# Patient Record
Sex: FEMALE | Race: WHITE | Employment: PART TIME | URBAN - METROPOLITAN AREA
[De-identification: names, ages, dates, MRNs, and addresses within clinical notes are randomized per-mention and may not be internally consistent; named-entity substitution may affect disease eponyms.]

---

## 2018-09-25 ENCOUNTER — OFFICE VISIT (OUTPATIENT)
Dept: FAMILY MEDICINE CLINIC | Age: 38
End: 2018-09-25

## 2018-09-25 VITALS
BODY MASS INDEX: 17.92 KG/M2 | HEART RATE: 78 BPM | TEMPERATURE: 97.5 F | OXYGEN SATURATION: 98 % | WEIGHT: 114.2 LBS | SYSTOLIC BLOOD PRESSURE: 108 MMHG | HEIGHT: 67 IN | RESPIRATION RATE: 12 BRPM | DIASTOLIC BLOOD PRESSURE: 68 MMHG

## 2018-09-25 DIAGNOSIS — R30.0 DYSURIA: ICD-10-CM

## 2018-09-25 DIAGNOSIS — N30.10 INTERSTITIAL CYSTITIS: ICD-10-CM

## 2018-09-25 DIAGNOSIS — N30.00 ACUTE CYSTITIS WITHOUT HEMATURIA: Primary | ICD-10-CM

## 2018-09-25 LAB
BILIRUB UR QL STRIP: NORMAL
GLUCOSE UR-MCNC: NORMAL MG/DL
KETONES P FAST UR STRIP-MCNC: NORMAL MG/DL
PH UR STRIP: 5 [PH] (ref 4.6–8)
PROT UR QL STRIP: NORMAL
SP GR UR STRIP: 1.01 (ref 1–1.03)
UA UROBILINOGEN AMB POC: NORMAL (ref 0.2–1)
URINALYSIS CLARITY POC: CLEAR
URINALYSIS COLOR POC: NORMAL
URINE BLOOD POC: NORMAL
URINE LEUKOCYTES POC: NEGATIVE
URINE NITRITES POC: POSITIVE

## 2018-09-25 RX ORDER — MONTELUKAST SODIUM 10 MG/1
10 TABLET ORAL DAILY
COMMUNITY
End: 2020-10-09

## 2018-09-25 RX ORDER — SULFAMETHOXAZOLE AND TRIMETHOPRIM 800; 160 MG/1; MG/1
1 TABLET ORAL 2 TIMES DAILY
Qty: 6 TAB | Refills: 0 | Status: SHIPPED | OUTPATIENT
Start: 2018-09-25 | End: 2018-09-28

## 2018-09-25 NOTE — MR AVS SNAPSHOT
[facility] [address] [place] [address] Road 
[phone] Patient: Rolly Sicard MRN: [id number] [id number] Visit Information Date & Time Provider Department Dept. Phone Encounter #  
 9/25/2018 11:30 AM Kenia Gorman MD [facility] [phone] [id number] Follow-up Instructions Return if symptoms worsen or fail to improve. Upcoming Health Maintenance Date Due Influenza Age 5 to Adult 8/1/2018 PAP AKA CERVICAL CYTOLOGY 9/8/2019 DTaP/Tdap/Td series (2 - Td) 12/2/2026 Allergies as of 9/25/2018  Review Complete On: 9/25/2018 By: May Viramontes No Known Allergies Current Immunizations  Never Reviewed Name Date  
 TB Skin Test (PPD) Intradermal 10/31/2016 Not reviewed this visit You Were Diagnosed With   
  
 Codes Comments Acute cystitis without hematuria    -  Primary ICD-10-CM: N30.00 ICD-9-CM: 595.0 Dysuria     ICD-10-CM: R30.0 ICD-9-CM: 740. 1 Vitals BP Pulse Temp Resp Height(growth percentile) Weight(growth percentile) 108/68 78 97.5 °F (36.4 °C) (Oral) 12 5' 7\" (1.702 m) 114 lb 3.2 oz (51.8 kg) SpO2 BMI OB Status Smoking Status 98% 17.89 kg/m2 Having regular periods Never Smoker BMI and BSA Data Body Mass Index Body Surface Area  
 17.89 kg/m 2 1.56 m 2 Preferred Pharmacy Pharmacy Name Phone [facility] [phone] - [address], [address] AT [address] [phone] Your Updated Medication List  
  
   
This list is accurate as of 9/25/18 12:12 PM.  Always use your most recent med list.  
  
  
  
  
 ELMIRON 100 mg capsule Generic drug:  pentosan polysulfate sodium  
  
 multivitamin tablet Commonly known as:  ONE A DAY Take 1 Tab by mouth daily. SINGULAIR 10 mg tablet Generic drug:  montelukast  
Take 10 mg by mouth daily. trimethoprim-sulfamethoxazole 160-800 mg per tablet Commonly known as:  BACTRIM DS Take 1 Tab by mouth two (2) times a day for 3 days. Prescriptions Sent to Pharmacy Refills  
 trimethoprim-sulfamethoxazole (BACTRIM DS) 160-800 mg per tablet 0 Sig: Take 1 Tab by mouth two (2) times a day for 3 days. Class: Normal  
 Pharmacy: [facility] [address] [place] [address] AT [address] Ph #: [phone] Route: Oral  
  
We Performed the Following AMB POC URINALYSIS DIP STICK AUTO W/O MICRO [55142 CPT(R)] Follow-up Instructions Return if symptoms worsen or fail to improve. Patient Instructions Pyridium comes in 100 and 200 mg, you can take it up to 3x/day Urinary Tract Infection in Women: Care Instructions Your Care Instructions A urinary tract infection, or UTI, is a general term for an infection anywhere between the kidneys and the urethra (where urine comes out). Most UTIs are bladder infections. They often cause pain or burning when you urinate. UTIs are caused by bacteria and can be cured with antibiotics. Be sure to complete your treatment so that the infection goes away. Follow-up care is a key part of your treatment and safety. Be sure to make and go to all appointments, and call your doctor if you are having problems. It's also a good idea to know your test results and keep a list of the medicines you take. How can you care for yourself at home? · Take your antibiotics as directed. Do not stop taking them just because you feel better. You need to take the full course of antibiotics. · Drink extra water and other fluids for the next day or two. This may help wash out the bacteria that are causing the infection. (If you have kidney, heart, or liver disease and have to limit fluids, talk with your doctor before you increase your fluid intake.) · Avoid drinks that are carbonated or have caffeine. They can irritate the bladder. · Urinate often. Try to empty your bladder each time. · To relieve pain, take a hot bath or lay a heating pad set on low over your lower belly or genital area. Never go to sleep with a heating pad in place. To prevent UTIs · Drink plenty of water each day. This helps you urinate often, which clears bacteria from your system. (If you have kidney, heart, or liver disease and have to limit fluids, talk with your doctor before you increase your fluid intake.) · Urinate when you need to. · Urinate right after you have sex. · Change sanitary pads often. · Avoid douches, bubble baths, feminine hygiene sprays, and other feminine hygiene products that have deodorants. · After going to the bathroom, wipe from front to back. When should you call for help? Call your doctor now or seek immediate medical care if: 
  · Symptoms such as fever, chills, nausea, or vomiting get worse or appear for the first time.  
  · You have new pain in your back just below your rib cage. This is called flank pain.  
  · There is new blood or pus in your urine.  
  · You have any problems with your antibiotic medicine.  
 Watch closely for changes in your health, and be sure to contact your doctor if: 
  · You are not getting better after taking an antibiotic for 2 days.  
  · Your symptoms go away but then come back. Where can you learn more? Go to http://esme-donovan.info/. Enter A393 in the search box to learn more about \"Urinary Tract Infection in Women: Care Instructions. \" Current as of: May 12, 2017 Content Version: 11.7 © 9050-8032 [employer]. Care instructions adapted under license by [employer] (which disclaims liability or warranty for this information).  If you have questions about a medical condition or this instruction, always ask your healthcare professional. [employer] disclaims any warranty or liability for your use of this information. Introducing [facility]! Dear Pranay Harrison: Thank you for requesting a Fairphone account. Our records indicate that you already have an active Fairphone account. You can access your account anytime at [email or web address]. [email or web address] Did you know that you can access your hospital and ER discharge instructions at any time in Fairphone? You can also review all of your test results from your hospital stay or ER visit. Additional Information If you have questions, please visit the Frequently Asked Questions section of the Fairphone website at [email or web address] Remember, Fairphone is NOT to be used for urgent needs. For medical emergencies, dial 911. Now available from your iPhone and Android! Please provide this summary of care documentation to your next provider. Your primary care clinician is listed as Lei Vargas. If you have any questions after today's visit, please call [phone].

## 2018-09-25 NOTE — PATIENT INSTRUCTIONS
Pyridium comes in 100 and 200 mg, you can take it up to 3x/day         Urinary Tract Infection in Women: Care Instructions  Your Care Instructions    A urinary tract infection, or UTI, is a general term for an infection anywhere between the kidneys and the urethra (where urine comes out). Most UTIs are bladder infections. They often cause pain or burning when you urinate. UTIs are caused by bacteria and can be cured with antibiotics. Be sure to complete your treatment so that the infection goes away. Follow-up care is a key part of your treatment and safety. Be sure to make and go to all appointments, and call your doctor if you are having problems. It's also a good idea to know your test results and keep a list of the medicines you take. How can you care for yourself at home? · Take your antibiotics as directed. Do not stop taking them just because you feel better. You need to take the full course of antibiotics. · Drink extra water and other fluids for the next day or two. This may help wash out the bacteria that are causing the infection. (If you have kidney, heart, or liver disease and have to limit fluids, talk with your doctor before you increase your fluid intake.)  · Avoid drinks that are carbonated or have caffeine. They can irritate the bladder. · Urinate often. Try to empty your bladder each time. · To relieve pain, take a hot bath or lay a heating pad set on low over your lower belly or genital area. Never go to sleep with a heating pad in place. To prevent UTIs  · Drink plenty of water each day. This helps you urinate often, which clears bacteria from your system. (If you have kidney, heart, or liver disease and have to limit fluids, talk with your doctor before you increase your fluid intake.)  · Urinate when you need to. · Urinate right after you have sex. · Change sanitary pads often.   · Avoid douches, bubble baths, feminine hygiene sprays, and other feminine hygiene products that have deodorants. · After going to the bathroom, wipe from front to back. When should you call for help? Call your doctor now or seek immediate medical care if:    · Symptoms such as fever, chills, nausea, or vomiting get worse or appear for the first time.     · You have new pain in your back just below your rib cage. This is called flank pain.     · There is new blood or pus in your urine.     · You have any problems with your antibiotic medicine.    Watch closely for changes in your health, and be sure to contact your doctor if:    · You are not getting better after taking an antibiotic for 2 days.     · Your symptoms go away but then come back. Where can you learn more? Go to http://esme-donovan.info/. Enter V756 in the search box to learn more about \"Urinary Tract Infection in Women: Care Instructions. \"  Current as of: May 12, 2017  Content Version: 11.7  © 7046-9479 [employer]. Care instructions adapted under license by [employer] (which disclaims liability or warranty for this information). If you have questions about a medical condition or this instruction, always ask your healthcare professional. [place] [address] any warranty or liability for your use of this information.

## 2018-09-25 NOTE — PROGRESS NOTES
Patient c/o painful urination x 3 days she also c/o abdominal cramping and pain. 1. Have you been to the ER, urgent care clinic since your last visit? Hospitalized since your last visit? No  2. Have you seen or consulted any other health care providers outside of the [address] [place] [address] since your last visit? Include any pap smears or colon screening. Yes When: Lizzie 2018 Where: Urology Reason for visit: follow up    Medication reconciliation has been completed with patient. Care team discussed/updated as well as pharmacy. Care everywhere has been ran. Health Maintenance reviewed - Declined flu vaccine.

## 2018-09-25 NOTE — PROGRESS NOTES
Tad Moore is a 34 y.o. female who was seen in clinic today (9/25/2018). Assessment & Plan:       ICD-10-CM ICD-9-CM    1. Acute cystitis without hematuria N30.00 595.0 trimethoprim-sulfamethoxazole (BACTRIM DS) 160-800 mg per tablet   2. Dysuria R30.0 788.1 AMB POC URINALYSIS DIP STICK AUTO W/O MICRO      montelukast (SINGULAIR) 10 mg tablet   3. Interstitial cystitis N30.10 595.1      Continue IC regimen  Short course oral antibiotics. Follow-up Disposition:  Return if symptoms worsen or fail to improve. Subjective:   Tad Moore was seen today for Dysuria    history of IC, \"flaring this summer more often\" recent visit with Urologist with resumption of pyridium and trial Singulair    Urinary burning, \"hellish pain\" (suprapubic) x 4 days not responding to typical IC management strategies, associated with frequency, gradually worsening    No history of recurrent UTI or pyelo       Results for orders placed or performed in visit on 09/25/18   AMB POC URINALYSIS DIP STICK AUTO W/O MICRO   Result Value Ref Range    Color (UA POC) Orange     Clarity (UA POC) Clear     Glucose (UA POC) Trace Negative    Bilirubin (UA POC) 2+ Negative    Ketones (UA POC) 4+ Negative    Specific gravity (UA POC) 1.015 1.001 - 1.035    Blood (UA POC) 1+ Negative    pH (UA POC) 5.0 4.6 - 8.0    Protein (UA POC) 1+ Negative    Urobilinogen (UA POC) 2 mg/dL 0.2 - 1    Nitrites (UA POC) Positive Negative    Leukocyte esterase (UA POC) Negative Negative       Outpatient Prescriptions Marked as Taking for the 9/25/18 encounter (Office Visit) with Harmeet Lynn MD   Medication Sig Dispense Refill    multivitamin (ONE A DAY) tablet Take 1 Tab by mouth daily.          Patient Active Problem List    Diagnosis    Pelvic floor dysfunction     Pelvic floor tension myalgia      Interstitial cystitis    Pectus excavatum    Murmur, heart    Endometriosis    Pes cavus         No Known Allergies      Patient Care Team:  Randy Amaya MD as PCP - General    The following sections were reviewed & updated as appropriate: PMH, PSH, FH, and SH. Review of Systems   Constitutional: Negative for fever. Gastrointestinal: Positive for nausea. Negative for vomiting. Genitourinary: Negative for hematuria. Objective:     Visit Vitals    /68    Pulse 78    Temp 97.5 °F (36.4 °C) (Oral)    Resp 12    Ht 5' 7\" (1.702 m)    Wt 114 lb 3.2 oz (51.8 kg)    SpO2 98%    BMI 17.89 kg/m2      Physical Exam   Constitutional: She is oriented to person, place, and time. She appears well-developed. No distress. Pleasant uncomfortable appearing white female   HENT:   Head: Normocephalic and atraumatic. Cardiovascular: Normal rate and regular rhythm. Exam reveals no gallop and no friction rub. Murmur heard. Pulmonary/Chest: Effort normal and breath sounds normal. No respiratory distress. She has no wheezes. She has no rhonchi. She has no rales. Abdominal: There is tenderness in the suprapubic area. There is no rigidity and no CVA tenderness. Musculoskeletal: She exhibits no edema. Neurological: She is alert and oriented to person, place, and time. Skin: Skin is warm and dry. She is not diaphoretic. Psychiatric: She has a normal mood and affect. Her behavior is normal. Judgment and thought content normal.         Disclaimer: The patient understands our medical plan. Alternatives have been explained and offered. The risks, benefits and significant side effects of all medications have been reviewed. Anticipated time course and progression of condition reviewed. All questions have been addressed. She is encouraged to employ the information provided in the after visit summary, which was reviewed.       Where appropriate, she is instructed to call the clinic if she has not been notified either by phone or through [address] with the results of her tests or with an appointment plan for any referrals within 1 week(s). No news is not good news; it's no news. The patient  is to call if her condition worsens or fails to improve or if significant side effects are experienced. Aspects of this note may have been generated using voice recognition software. Despite editing, there may be unrecognized errors.        Sloan Primrose, MD

## 2019-07-02 ENCOUNTER — OFFICE VISIT (OUTPATIENT)
Dept: FAMILY MEDICINE CLINIC | Age: 39
End: 2019-07-02

## 2019-07-02 ENCOUNTER — HOSPITAL ENCOUNTER (OUTPATIENT)
Dept: LAB | Age: 39
Discharge: HOME | End: 2019-07-02
Payer: OTHER GOVERNMENT

## 2019-07-02 VITALS
SYSTOLIC BLOOD PRESSURE: 100 MMHG | TEMPERATURE: 97.9 F | DIASTOLIC BLOOD PRESSURE: 68 MMHG | HEIGHT: 67 IN | HEART RATE: 71 BPM | BODY MASS INDEX: 18.15 KG/M2 | OXYGEN SATURATION: 99 % | WEIGHT: 115.6 LBS | RESPIRATION RATE: 14 BRPM

## 2019-07-02 DIAGNOSIS — Z13.220 SCREENING, LIPID: ICD-10-CM

## 2019-07-02 DIAGNOSIS — N30.10 INTERSTITIAL CYSTITIS: Primary | ICD-10-CM

## 2019-07-02 DIAGNOSIS — R63.6 UNDERWEIGHT DUE TO INADEQUATE CALORIC INTAKE: ICD-10-CM

## 2019-07-02 DIAGNOSIS — Z13.1 SCREENING FOR DIABETES MELLITUS: ICD-10-CM

## 2019-07-02 DIAGNOSIS — M62.89 PELVIC FLOOR DYSFUNCTION: ICD-10-CM

## 2019-07-02 LAB
CHOLEST SERPL-MCNC: 195 MG/DL
GLUCOSE SERPL-MCNC: 94 MG/DL (ref 74–99)
HDLC SERPL-MCNC: 81 MG/DL (ref 40–60)
HDLC SERPL: 2.4 {RATIO} (ref 0–5)
LDLC SERPL CALC-MCNC: 103.8 MG/DL (ref 0–100)
LIPID PROFILE,FLP: ABNORMAL
TRIGL SERPL-MCNC: 51 MG/DL (ref ?–150)
VLDLC SERPL CALC-MCNC: 10.2 MG/DL

## 2019-07-02 PROCEDURE — 80061 LIPID PANEL: CPT

## 2019-07-02 PROCEDURE — 82947 ASSAY GLUCOSE BLOOD QUANT: CPT

## 2019-07-02 NOTE — Clinical Note
Dr. Jennifer Kasper, I have a loreta patient, very well known to me, who has been absolutely miserable with IC for many years. Experienced complete relief of her symptoms during pregnancy and lactation. No relief with typical therapies since then as per my note. Some clinical improvement with self imposed dietary restrictions which have now led to an underweight status. She's resorted to see a chiropractor/nutritionist out of desperation, and asked me to order a CRP on his behalf. I'm not ordering anything I'm not prepared to understand to be actionable for the given complaint. Is this a thing? I'd vastly prefer she stay engaged with urology/urogyn. Is this something you think you may be able to help her with?  Thanks, Jose D Reed

## 2019-07-02 NOTE — PROGRESS NOTES
Mykel Jiang is a 37 y.o. female here for Well Woman Exam    Assessment/Plan:       ICD-10-CM ICD-9-CM    1. Interstitial cystitis N30.10 595.1    2. Screening, lipid Z13.220 V77.91 LIPID PANEL W/ REFLX DIRECT LDL   3. Screening for diabetes mellitus Z13.1 V77.1 GLUCOSE, RANDOM   4. Pelvic floor dysfunction M62.89 618.83    5. Underweight due to inadequate caloric intake R63.6 783.22      Screening labs for insurance form. IC/pelvic floor dysfunction uncontrolled  Concur with plan for PT    Yes, all her symptoms resolved during pregnancy and lactation, which does imply some hormonal component - that hasn't translated into an actionable lab test. It begs the question of how much of her condition is related to her endometriosis. I'm glad she's enjoyed some clinical relief through \"enzymes\" and dietary restrictions. Many antibiotics carry antiinflammatory properties, hence not entirely surprising for her to experience relief in the wake of antibiotics. That does not mean her symptoms are due to infection. Invited her to bring the information she found suggesting \"deep infection\" not routinely tested for. I'm not interested in ordering tests on behalf of others whose credentials do translate into their independent ability to place orders themselves. Among many reasons, I become responsible for the test result. I am, however, willing to pursue better understanding of how CRP may be helpful in the management of IC. Encouraged trial gyn focused probiotics pending further recommendations          Follow-up and Dispositions    · Return for IC plan by phone if no word from me in 2 weeks. Subjective:   Mykel Jiang is a 37 y.o. female       Very frustrated with chronic discomfort from her IC. Out of bed 5-20/night to void. Exhausted.     Post 8 bladder med installations - without clinical relief  Elmiron not effective  Food restrictions somewhat helpful (and helping with GI regularity as well) - but resulting in weight loss  Always feels \"great\" after antibiotics  symptoms completely resolved during pregnancy and nursing. (Wonders about hormone testing then)    Seeing urologist now, but routinely feels dismissed. Believes she is not interested in exploring other therapeutic options. Absolutely miserable. 7/25 start date of pelvic floor PT    Seeing [employer] in [facility] (Masters in clinical nutrition, Chiropractor) prescribing the enzymes - which she states will change often  He is asking for a C Reactive Protein      Current Outpatient Medications   Medication Sig Dispense Refill    OTHER Indications: Herbal Supplements      multivitamin (ONE A DAY) tablet Take 1 Tab by mouth daily.  montelukast (SINGULAIR) 10 mg tablet Take 10 mg by mouth daily. No Known Allergies    Patient Active Problem List    Diagnosis    Underweight due to inadequate caloric intake    Pelvic floor dysfunction     Pelvic floor tension myalgia      Interstitial cystitis    Pectus excavatum    Murmur, heart    Endometriosis    Pes cavus       Ignacio Rock is a 37 y.o. female who was seen in clinic today (7/2/2019). The following sections were reviewed & updated as appropriate: PMH, PSH, FH, and SH. Objective:     Visit Vitals  /68   Pulse 71   Temp 97.9 °F (36.6 °C) (Oral)   Resp 14   Ht 5' 7\" (1.702 m)   Wt 115 lb 9.6 oz (52.4 kg)   LMP 06/16/2019   SpO2 99%   BMI 18.11 kg/m²      Physical Exam   Constitutional: She is oriented to person, place, and time. She appears well-developed. No distress. Pleasant, thin, uncomfortable appearing white female   HENT:   Head: Normocephalic and atraumatic. Pulmonary/Chest: Effort normal.   Neurological: She is alert and oriented to person, place, and time. Psychiatric: She has a normal mood and affect. Her behavior is normal. Judgment and thought content normal.           Disclaimer: The patient understands our medical plan.   Alternatives have been explained and offered. All questions have been addressed. She is encouraged to employ the information provided in the after visit summary, which was reviewed. She is instructed to call the clinic if she has not been notified either by phone or through [address] with the results of her tests or with an appointment plan for any referrals within 1 week(s). No news is not good news; it's no news. The patient  is to call if her condition worsens or fails to improve or if significant side effects are experienced. Aspects of this note may have been generated using voice recognition software. Despite editing, unrecognized errors may occur. Over 50% of the 25 minutes face to face with Tina Gannon consisted of counseling and/or discussing treatment plans.            Suhail Butts MD

## 2019-07-02 NOTE — PATIENT INSTRUCTIONS
Probiotics for gynecological health: 
Daily oral intake of L rhamnosus GR-1 and L fermentum RC-14 can modify the vaginal madhu and may reduce incidences of yeast infections and bacterial vaginosis. Look for a refrigerated product containing these strains. Probiotics specifically targeting the health of the gastrointestinal (GI) tract include: 
 
Align, [employer], TruNature, Florastor. These are over the counter products. Follow the 's instructions. It may take several weeks to appreciate a benefit. Consider letting me refer you to urogyn

## 2019-07-02 NOTE — PROGRESS NOTES
Patient here for her well woman exam, no pap. She has health assessment form she would like completed. She states she is in PT for pelvic floor conditioning and she also sees an [facility]. 1. Have you been to the ER, urgent care clinic since your last visit? Hospitalized since your last visit? No  2. Have you seen or consulted any other health care providers outside of the [address] [place] [address] since your last visit? Include any pap smears or colon screening. No    Medication reconciliation has been completed with patient. Care team discussed/updated as well as pharmacy. Health Maintenance reviewed - Pap due in September patient declined to do today. Lord Bruce

## 2019-07-05 DIAGNOSIS — R30.0 DYSURIA: Primary | ICD-10-CM

## 2019-07-05 DIAGNOSIS — R30.0 DYSURIA: ICD-10-CM

## 2019-07-23 ENCOUNTER — HOSPITAL ENCOUNTER (OUTPATIENT)
Dept: LAB | Age: 39
Discharge: HOME | End: 2019-07-23
Payer: OTHER GOVERNMENT

## 2019-07-23 DIAGNOSIS — R30.0 DYSURIA: ICD-10-CM

## 2019-07-23 PROCEDURE — 87798 DETECT AGENT NOS DNA AMP: CPT

## 2019-07-23 PROCEDURE — 36415 COLL VENOUS BLD VENIPUNCTURE: CPT

## 2019-07-23 PROCEDURE — 87086 URINE CULTURE/COLONY COUNT: CPT

## 2019-07-24 LAB
BACTERIA SPEC CULT: NORMAL
SERVICE CMNT-IMP: NORMAL

## 2019-07-27 LAB
COMMENT, 180044: NORMAL
M GENITALIUM DNA SPEC QL NAA+PROBE: NEGATIVE
SPECIMEN SOURCE: NORMAL

## 2019-07-29 NOTE — PROGRESS NOTES
Clementine Gardner, please call the lab. The fungal culture currently listed as pending a specimen was to be done on the urine submitted.  RIDGE

## 2019-07-30 DIAGNOSIS — R30.0 DYSURIA: Primary | ICD-10-CM

## 2019-07-30 NOTE — PROGRESS NOTES
Patient called the office back had her verify her  she has been made aware of the missed test. She states when she went to [facility] to submit her sample she was asked if we had given her a swab. She is currently on vacation in [place], told I will do some research on the collection of this test and asked that she call me when she returns to discuss collection, she has expressed understanding and agrees with this plan.

## 2019-07-30 NOTE — PROGRESS NOTES
Called  [facility] spoke with Courtney Sotomayor who was not able to find an order for the fungal culture he states it looks like HV did not scan that order into \"the system\". Called patient left her a message asking her to call the office back, will see if she can stop back by HV to submit urine sample.

## 2019-08-09 NOTE — PROGRESS NOTES
Per [employer] the ureaplasma/myco culture for urine sample just needs to be collected in a sterile container as well as fungal culture on urine should be sent in a sterile container.

## 2020-08-19 ENCOUNTER — TELEPHONE (OUTPATIENT)
Dept: FAMILY MEDICINE CLINIC | Age: 40
End: 2020-08-19

## 2020-08-19 NOTE — TELEPHONE ENCOUNTER
Mrs. Phillip Joya called to request an appointment with Dr. Ben Chavez for a biometric screening. She would like a return call to schedule this. Explained to her that Dr. Ben Chavez was doing virtual and two days a week. I would forward the message and someone would return the call.

## 2020-08-24 NOTE — TELEPHONE ENCOUNTER
Called patient back she has been told Dr. Iris Pcae is not seeing patient in the office unless it is something that can not be done virtually. Explained to patient she may have to come to the office for nurse visit then have a VV with Dr. Iris Pace, she is okay with this, please advise.

## 2020-08-28 NOTE — TELEPHONE ENCOUNTER
I'm not really sure what this means. If she's got a standardized form, please review it with her to figure out what's needed.  RIDGE

## 2020-09-01 NOTE — TELEPHONE ENCOUNTER
Called patient  verified asked if she can scan her form in and send this via my chart so Dr. Mónica Mayo can have a look at this to determine if she needs a visit or if she can just put orders in. Patient says she will scan the form in and send this via My Chart.

## 2020-09-22 ENCOUNTER — PATIENT MESSAGE (OUTPATIENT)
Dept: FAMILY MEDICINE CLINIC | Age: 40
End: 2020-09-22

## 2020-09-24 NOTE — TELEPHONE ENCOUNTER
From: Harjinder Poole  To: Carmen Portillo MD  Sent: 9/22/2020 10:16 PM EDT  Subject: Non-Urgent Andrew Laurent,  I attached the health screening paper work that I need to get done soon. Are you able to order this for a nurse visit?   Thank you and hope you are doing well,  Harjinder Poole

## 2020-09-25 NOTE — TELEPHONE ENCOUNTER
Regarding: Non-Urgent Medical Question  Contact: [phone]  ----- Message from Prosper Patel MD sent at 9/24/2020 12:52 PM EDT -----  Richardfior Narinder, as you know, ordinarily I would order labs at the time of a preventive care visit - which is an option here as she's due for a pap. Alternatively, we could offer for her to have the required labs drawn before the wellness visit so I can complete the form for her when she comes for the pap? What do you think? Feel free to coordinate with [facility] and just get it scheduled without me. KJS      ----- Message from Summer Galdamez to Aubrey Fay MD sent at 9/22/2020 10:16 PM -----   Hi Dr. Wojciech Becker,  I attached the health screening paper work that I need to get done soon. Are you able to order this for a nurse visit?   Thank you and hope you are doing well,  Pauly Suggs

## 2020-09-25 NOTE — TELEPHONE ENCOUNTER
Scheduled an appointment with Ms. Kathryn Lamb. She preferred it be a nurse visit. Explained Dr. Umu Mortensen requires an appointment for the screening. Scheduled 10/9/20. I went over arrival protocols w/the patient.

## 2020-10-09 ENCOUNTER — OFFICE VISIT (OUTPATIENT)
Dept: FAMILY MEDICINE CLINIC | Age: 40
End: 2020-10-09
Payer: OTHER GOVERNMENT

## 2020-10-09 ENCOUNTER — HOSPITAL ENCOUNTER (OUTPATIENT)
Dept: LAB | Age: 40
Discharge: HOME | End: 2020-10-09
Payer: OTHER GOVERNMENT

## 2020-10-09 VITALS
TEMPERATURE: 97.8 F | SYSTOLIC BLOOD PRESSURE: 102 MMHG | WEIGHT: 125.4 LBS | RESPIRATION RATE: 14 BRPM | DIASTOLIC BLOOD PRESSURE: 60 MMHG | BODY MASS INDEX: 19.64 KG/M2 | OXYGEN SATURATION: 94 % | HEART RATE: 77 BPM

## 2020-10-09 DIAGNOSIS — Z13.220 SCREENING, LIPID: ICD-10-CM

## 2020-10-09 DIAGNOSIS — Z13.1 SCREENING FOR DIABETES MELLITUS: ICD-10-CM

## 2020-10-09 DIAGNOSIS — Z01.419 WELL WOMAN EXAM: Primary | ICD-10-CM

## 2020-10-09 DIAGNOSIS — Z01.419 WELL WOMAN EXAM: ICD-10-CM

## 2020-10-09 PROBLEM — H40.1134 PRIMARY OPEN-ANGLE GLAUCOMA, BILATERAL, INDETERMINATE STAGE: Status: ACTIVE | Noted: 2017-09-13

## 2020-10-09 PROBLEM — H40.003 GLAUCOMA SUSPECT OF BOTH EYES: Status: ACTIVE | Noted: 2017-09-13

## 2020-10-09 LAB
ANION GAP SERPL CALC-SCNC: 5 MMOL/L (ref 3–18)
BUN SERPL-MCNC: 9 MG/DL (ref 7–18)
BUN/CREAT SERPL: 12 (ref 12–20)
CALCIUM SERPL-MCNC: 9.3 MG/DL (ref 8.5–10.1)
CHLORIDE SERPL-SCNC: 107 MMOL/L (ref 100–111)
CHOLEST SERPL-MCNC: 199 MG/DL
CO2 SERPL-SCNC: 28 MMOL/L (ref 21–32)
CREAT SERPL-MCNC: 0.74 MG/DL (ref 0.6–1.3)
GLUCOSE SERPL-MCNC: 86 MG/DL (ref 74–99)
HDLC SERPL-MCNC: 84 MG/DL (ref 40–60)
HDLC SERPL: 2.4 {RATIO} (ref 0–5)
LDLC SERPL CALC-MCNC: 100.8 MG/DL (ref 0–100)
LIPID PROFILE,FLP: ABNORMAL
POTASSIUM SERPL-SCNC: 4.1 MMOL/L (ref 3.5–5.5)
SODIUM SERPL-SCNC: 140 MMOL/L (ref 136–145)
TRIGL SERPL-MCNC: 71 MG/DL (ref ?–150)
TSH SERPL-ACNC: 3.18 UIU/ML (ref 0.36–3.74)
VLDLC SERPL CALC-MCNC: 14.2 MG/DL

## 2020-10-09 PROCEDURE — 80061 LIPID PANEL: CPT

## 2020-10-09 PROCEDURE — 84443 ASSAY THYROID STIM HORMONE: CPT

## 2020-10-09 PROCEDURE — 36415 COLL VENOUS BLD VENIPUNCTURE: CPT

## 2020-10-09 PROCEDURE — 99396 PREV VISIT EST AGE 40-64: CPT | Performed by: FAMILY MEDICINE

## 2020-10-09 PROCEDURE — 80048 BASIC METABOLIC PNL TOTAL CA: CPT

## 2020-10-09 RX ORDER — LATANOPROST 50 UG/ML
SOLUTION/ DROPS OPHTHALMIC
COMMUNITY
Start: 2020-09-13

## 2020-10-09 NOTE — PROGRESS NOTES
Patient here for her wellness exam. She has had her pap smear done with GYN will request records. She has declined to have a breast exam today. She does have a form to be completed. 1. Have you been to the ER, urgent care clinic since your last visit? Hospitalized since your last visit? No  2. Have you seen or consulted any other health care providers outside of the [address] [place] [address] since your last visit? Include any pap smears or colon screening. Yes When: Feb. 2020 Where: GYN Reason for visit: follow up, has also seen Urology. Medication reconciliation has been completed with patient. Care team discussed/updated as well as pharmacy. Health Maintenance Due   Topic Date Due    PAP AKA CERVICAL CYTOLOGY  09/08/2019    Flu Vaccine (1) 09/01/2020     Health Maintenance reviewed - Flu vaccine declined.

## 2020-10-09 NOTE — PROGRESS NOTES
Kelli Borjas is a 41 y.o. female here for Well Woman Exam    Assessment/Plan:     Diagnoses and all orders for this visit:    1. Well woman exam      GYN care rendered elsewhere. Obtaining records. PNVs recommended    Influenza vaccination was recommended in accordance with CDC guidelines. She declined. I encouraged her to reconsider and return should she change her mind. Follow-up and Dispositions    · Return in about 1 year (around 10/9/2021) for well woman/preventive care - no pap. Subjective:   Kelli Borjas is a 41 y.o. female here for a preventive care visit    Overall doing well. GI issues/weight loss resolved with elimination of gluten and dairy from her diet    Pelvic issues responding very well to pelvic PT with dry needling. Dr. Masha Cowan    Glaucoma suspect - doesn't recall who in Dr. Jaleel Ambrosio office she sees. Also seeing a dermatologist    No contraception.  Not actively planning but open to another pregnancy    Lab Results   Component Value Date/Time    Sodium 137 12/02/2016 02:45 PM    Potassium 3.9 12/02/2016 02:45 PM    Chloride 101 12/02/2016 02:45 PM    CO2 27 12/02/2016 02:45 PM    Anion gap 9 12/02/2016 02:45 PM    Glucose 94 07/02/2019 12:00 PM    BUN 14 12/02/2016 02:45 PM    Creatinine 0.79 12/02/2016 02:45 PM    BUN/Creatinine ratio 18 12/02/2016 02:45 PM    GFR est AA >60 12/02/2016 02:45 PM    GFR est non-AA >60 12/02/2016 02:45 PM    Calcium 9.3 12/02/2016 02:45 PM     Lab Results   Component Value Date/Time    Cholesterol, total 195 07/02/2019 12:00 PM    HDL Cholesterol 81 (H) 07/02/2019 12:00 PM    LDL, calculated 103.8 (H) 07/02/2019 12:00 PM    VLDL, calculated 10.2 07/02/2019 12:00 PM    Triglyceride 51 07/02/2019 12:00 PM    CHOL/HDL Ratio 2.4 07/02/2019 12:00 PM       Health Maintenance   Topic Date Due    PAP AKA CERVICAL CYTOLOGY  09/08/2019    Flu Vaccine (1) 06/30/2021 (Originally 9/1/2020)    Lipid Screen  07/02/2024    DTaP/Tdap/Td series (2 - Td) 2026    Pneumococcal 0-64 years  Aged Out         Family History   Problem Relation Age of Onset    Thyroid Disease Sister         hypothyroidism    Arthritis-rheumatoid Sister 45    Stroke Maternal Grandfather     Cancer Maternal Grandfather         melanoma    Stroke Paternal Grandfather     Dementia Maternal Grandmother     Glaucoma Maternal Grandmother     Macular Degen Paternal Grandmother     Celiac Disease Sister     Bleeding Prob Neg Hx     Anesth Problems Neg Hx      Past Medical History:   Diagnosis Date    Murmur, heart      (normal spontaneous vaginal delivery)     36 week EGA     Past Surgical History:   Procedure Laterality Date    HX GYN       Social History     Tobacco Use    Smoking status: Never Smoker    Smokeless tobacco: Never Used   Substance Use Topics    Alcohol use: No       Lab Results   Component Value Date/Time    Sodium 137 2016 02:45 PM    Potassium 3.9 2016 02:45 PM    Chloride 101 2016 02:45 PM    CO2 27 2016 02:45 PM    Anion gap 9 2016 02:45 PM    Glucose 94 2019 12:00 PM    BUN 14 2016 02:45 PM    Creatinine 0.79 2016 02:45 PM    BUN/Creatinine ratio 18 2016 02:45 PM    GFR est AA >60 2016 02:45 PM    GFR est non-AA >60 2016 02:45 PM    Calcium 9.3 2016 02:45 PM     Lab Results   Component Value Date/Time    Cholesterol, total 195 2019 12:00 PM    HDL Cholesterol 81 (H) 2019 12:00 PM    LDL, calculated 103.8 (H) 2019 12:00 PM    VLDL, calculated 10.2 2019 12:00 PM    Triglyceride 51 2019 12:00 PM    CHOL/HDL Ratio 2.4 2019 12:00 PM       Prior to Admission medications    Medication Sig Start Date End Date Taking? Authorizing Provider   latanoprost (XALATAN) 0.005 % ophthalmic solution INT 1 GTT IN OU HS 20  Yes Provider, Historical   diazePAM (Valium) 10 mg tablet Insert 1 Tab into vagina daily. Max Daily Amount: 10 mg.  Please compound to Suppository or gel. Apply vaginally every night. 6/16/20  Yes Tasha Wyatt MD   amitriptyline (ELAVIL) 25 mg tablet Take 1 Tab by mouth nightly. 5/15/20  Yes Esme Damian MD   OTHER Indications: Herbal Supplements   Yes Provider, Historical   multivitamin (ONE A DAY) tablet Take 1 Tab by mouth daily. Yes Provider, Historical   Aurovela Fe 1-20, 28, 1 mg-20 mcg (21)/75 mg (7) tab TK 1 T PO QD 4/25/20 10/9/20  Provider, Historical   amoxicillin-clavulanate (AUGMENTIN) 875-125 mg per tablet Take 1 Tab by mouth two (2) times a day. 3/4/20 10/9/20  Mike Perez MD   montelukast (SINGULAIR) 10 mg tablet Take 10 mg by mouth daily. 10/9/20  Provider, Historical       ROS:  Feeling well. No dyspnea or chest pain on exertion. No abdominal pain, change in bowel habits, black or bloody stools. Objective:     Visit Vitals  /60 (BP 1 Location: Left arm, BP Patient Position: Sitting)   Pulse 77   Temp 97.8 °F (36.6 °C) (Oral)   Resp 14   Wt 125 lb 6.4 oz (56.9 kg)   SpO2 94%   BMI 19.64 kg/m²        The patient appears well, alert, oriented x 3, in no distress. ENT normal.  Neck supple. No adenopathy or thyromegaly. Lungs are clear, good air entry, no wheezes, rhonchi or rales. S1 and S2 normal, 3/6 systolic murmur as previously identified, regular rate and rhythm. Abdomen soft without tenderness, guarding, mass or organomegaly. Extremities show no edema. Neurological is without focal findings. Disclaimer: The patient understands our medical plan. Alternatives have been explained and offered. All questions have been addressed. She is encouraged to employ the information provided in the after visit summary, which was reviewed. She is instructed to call the clinic if she has not been notified either by phone or through [address] with the results of her tests or with an appointment plan for any referrals within 1 week(s). No news is not good news; it's no news.  The patient  is to call if her condition worsens or fails to improve or if significant side effects are experienced. Aspects of this note may have been generated using voice recognition software. Despite editing, unrecognized errors may occur.        Swati Jones MD

## 2020-10-09 NOTE — PATIENT INSTRUCTIONS
A Healthy Lifestyle: Care Instructions Your Care Instructions A healthy lifestyle can help you feel good, stay at a healthy weight, and have plenty of energy for both work and play. A healthy lifestyle is something you can share with your whole family. A healthy lifestyle also can lower your risk for serious health problems, such as high blood pressure, heart disease, and diabetes. You can follow a few steps listed below to improve your health and the health of your family. Follow-up care is a key part of your treatment and safety. Be sure to make and go to all appointments, and call your doctor if you are having problems. It's also a good idea to know your test results and keep a list of the medicines you take. How can you care for yourself at home? · Do not eat too much sugar, fat, or fast foods. You can still have dessert and treats now and then. The goal is moderation. · Start small to improve your eating habits. Pay attention to portion sizes, drink less juice and soda pop, and eat more fruits and vegetables. ? Eat a healthy amount of food. A 3-ounce serving of meat, for example, is about the size of a deck of cards. Fill the rest of your plate with vegetables and whole grains. ? Limit the amount of soda and sports drinks you have every day. Drink more water when you are thirsty. ? Eat at least 5 servings of fruits and vegetables every day. It may seem like a lot, but it is not hard to reach this goal. A serving or helping is 1 piece of fruit, 1 cup of vegetables, or 2 cups of leafy, raw vegetables. Have an apple or some carrot sticks as an afternoon snack instead of a candy bar. Try to have fruits and/or vegetables at every meal. 
· Make exercise part of your daily routine. You may want to start with simple activities, such as walking, bicycling, or slow swimming. Try to be active 30 to 60 minutes every day.  You do not need to do all 30 to 60 minutes all at once. For example, you can exercise 3 times a day for 10 or 20 minutes. Moderate exercise is safe for most people, but it is always a good idea to talk to your doctor before starting an exercise program. 
· Keep moving. Bright Bake the lawn, work in the garden, or [employer]. Take the stairs instead of the elevator at work. · If you smoke, quit. People who smoke have an increased risk for heart attack, stroke, cancer, and other lung illnesses. Quitting is hard, but there are ways to boost your chance of quitting tobacco for good. ? Use nicotine gum, patches, or lozenges. ? Ask your doctor about stop-smoking programs and medicines. ? Keep trying. In addition to reducing your risk of diseases in the future, you will notice some benefits soon after you stop using tobacco. If you have shortness of breath or asthma symptoms, they will likely get better within a few weeks after you quit. · Limit how much alcohol you drink. Moderate amounts of alcohol (up to 2 drinks a day for men, 1 drink a day for women) are okay. But drinking too much can lead to liver problems, high blood pressure, and other health problems. Family health If you have a family, there are many things you can do together to improve your health. · Eat meals together as a family as often as possible. · Eat healthy foods. This includes fruits, vegetables, lean meats and dairy, and whole grains. · Include your family in your fitness plan. Most people think of activities such as jogging or tennis as the way to fitness, but there are many ways you and your family can be more active. Anything that makes you breathe hard and gets your heart pumping is exercise. Here are some tips: 
? Walk to do errands or to take your child to school or the bus. 
? Go for a family bike ride after dinner instead of watching TV. Where can you learn more? Go to http://www.[employer]/ Enter S841 in the search box to learn more about \"A Healthy Lifestyle: Care Instructions. \" Current as of: January 31, 2020               Content Version: 12.6 © 2969-7245 [employer]. Care instructions adapted under license by [employer] (which disclaims liability or warranty for this information). If you have questions about a medical condition or this instruction, always ask your healthcare professional. [place] [address] any warranty or liability for your use of this information.

## 2020-10-14 ENCOUNTER — PATIENT MESSAGE (OUTPATIENT)
Dept: FAMILY MEDICINE CLINIC | Age: 40
End: 2020-10-14

## 2020-10-19 ENCOUNTER — E-VISIT (OUTPATIENT)
Dept: FAMILY MEDICINE CLINIC | Age: 40
End: 2020-10-19
Payer: OTHER GOVERNMENT

## 2020-10-19 ENCOUNTER — TELEPHONE (OUTPATIENT)
Dept: FAMILY MEDICINE CLINIC | Age: 40
End: 2020-10-19

## 2020-10-19 DIAGNOSIS — N30.00 ACUTE CYSTITIS WITHOUT HEMATURIA: Primary | ICD-10-CM

## 2020-10-19 PROCEDURE — 99421 OL DIG E/M SVC 5-10 MIN: CPT | Performed by: FAMILY MEDICINE

## 2020-10-19 RX ORDER — SULFAMETHOXAZOLE AND TRIMETHOPRIM 800; 160 MG/1; MG/1
1 TABLET ORAL 2 TIMES DAILY
Qty: 6 TAB | Refills: 0 | Status: SHIPPED | OUTPATIENT
Start: 2020-10-19 | End: 2020-10-22

## 2020-10-19 NOTE — TELEPHONE ENCOUNTER
Called patient back  verified she asked if I had gotten a response from Dr. Sonny Casanova on her E-Visit told Dr. Sonny Casanova will respond directly to her and I jaja put in a message to let Dr. Sonny Casanova know she was asking status.

## 2020-10-19 NOTE — TELEPHONE ENCOUNTER
Patient called the office stating she think she has an UTI has painful urination, she has been advised to download my Chart Marylou to create an E-Visit with Dr. Rohit Denton for this. She has expressed understanding and agrees with this plan she has been told to call the office back if she has any issues with this.

## 2020-10-19 NOTE — TELEPHONE ENCOUNTER
Surekha Ricketts (1980) initiated an asynchronous digital communication through [address] [place] [address]. HPI: per patient questionnaire Exam: not applicable Diagnoses and all orders for this visit: 
Diagnoses and all orders for this visit: 
 
1. Acute cystitis without hematuria 
-     trimethoprim-sulfamethoxazole (BACTRIM DS, SEPTRA DS) 160-800 mg per tablet; Take 1 Tab by mouth two (2) times a day for 3 days. Time: EV1 - 5-10 minutes were spent on the digital evaluation and management of this patient.  
 
 
Julio Boswell MD

## 2020-10-19 NOTE — TELEPHONE ENCOUNTER
Mrs. Guerrero Emory is calling to speak directly with Selene Del Real. E-Visit seems to be initiated. Call [phone].

## 2020-10-22 ENCOUNTER — TELEPHONE (OUTPATIENT)
Dept: FAMILY MEDICINE CLINIC | Age: 40
End: 2020-10-22

## 2020-10-22 NOTE — TELEPHONE ENCOUNTER
Patient called requesting a call back from [place] in ref to her [employer]. Please return her call at [phone].

## 2020-10-22 NOTE — TELEPHONE ENCOUNTER
Called patient back  verified she says she received an email saying the DOS was missing from her wellness form pulled this from media apologized to patient for missing this date has been added and form has been re-faxed to [facility].

## 2020-11-12 ENCOUNTER — TELEPHONE (OUTPATIENT)
Dept: FAMILY MEDICINE CLINIC | Age: 40
End: 2020-11-12

## 2020-11-12 ENCOUNTER — VIRTUAL VISIT (OUTPATIENT)
Dept: FAMILY MEDICINE CLINIC | Age: 40
End: 2020-11-12
Payer: OTHER GOVERNMENT

## 2020-11-12 DIAGNOSIS — N30.00 ACUTE CYSTITIS WITHOUT HEMATURIA: Primary | ICD-10-CM

## 2020-11-12 PROCEDURE — 99214 OFFICE O/P EST MOD 30 MIN: CPT | Performed by: FAMILY MEDICINE

## 2020-11-12 RX ORDER — DOXYCYCLINE 100 MG/1
100 CAPSULE ORAL 2 TIMES DAILY
Qty: 20 CAP | Refills: 0 | Status: SHIPPED | OUTPATIENT
Start: 2020-11-12 | End: 2020-11-22

## 2020-11-12 NOTE — TELEPHONE ENCOUNTER
Mrs. Emmy Cole called stating that she would like to have a culture done to test her urine. She thinks she still has a UTI. She would like a return call at [phone]. She is also submitting e-visit.

## 2020-11-12 NOTE — TELEPHONE ENCOUNTER
Called patient back phone went straight to voicemail left message letting her know I was returning her call asked that she call me back, I will be here today until about 1:30 PM.

## 2020-11-12 NOTE — PROGRESS NOTES
Loreta Mclaughlin is a 41 y.o. female who was seen by synchronous (real-time) audio-video technology on 2020 for Bladder Infection        Assessment & Plan:   Diagnoses and all orders for this visit:    1. Acute cystitis without hematuria  -     CULTURE, URINE; Future  -     URINALYSIS W/MICROSCOPIC; Future  -     doxycycline (VIBRAMYCIN) 100 mg capsule; Take 1 Cap by mouth two (2) times a day for 10 days. 712  Subjective:       Prior to Admission medications    Medication Sig Start Date End Date Taking? Authorizing Provider   latanoprost (XALATAN) 0.005 % ophthalmic solution INT 1 GTT IN OU HS 20  Yes Provider, Historical   OTHER Indications: Herbal Supplements   Yes Provider, Historical   multivitamin (ONE A DAY) tablet Take 1 Tab by mouth daily. Yes Provider, Historical   diazePAM (Valium) 10 mg tablet Insert 1 Tab into vagina daily. Max Daily Amount: 10 mg. Please compound to Suppository or gel. Apply vaginally every night. 20   Jenna Wyatt MD   amitriptyline (ELAVIL) 25 mg tablet Take 1 Tab by mouth nightly. 5/15/20   Kevin Hernandez MD     Patient Active Problem List   Diagnosis Code    Endometriosis N80.9    Pes cavus Q66.70    Murmur, heart R01.1    Pectus excavatum Q67.6    Interstitial cystitis N30.10    Pelvic floor dysfunction M62.89    Underweight due to inadequate caloric intake R63.6    Glaucoma suspect of both eyes H40.003     Past Medical History:   Diagnosis Date    Murmur, heart      (normal spontaneous vaginal delivery)     36 week EGA       Review of Systems   Constitutional: Negative for chills and fever. Respiratory: Negative for cough and shortness of breath. Cardiovascular: Negative for chest pain and palpitations. Genitourinary: Positive for dysuria and frequency. Negative for flank pain. Neurological: Negative. Psychiatric/Behavioral: Negative. Objective:   No flowsheet data found.    General: alert, cooperative, no distress   Mental  status: normal mood, behavior, speech, dress, motor activity, and thought processes, able to follow commands   HENT: NCAT   Neck: no visualized mass   Resp: no respiratory distress   Neuro: no gross deficits   Skin: no discoloration or lesions of concern on visible areas   Psychiatric: normal affect, consistent with stated mood, no evidence of hallucinations     Additional exam findings: We discussed the expected course, resolution and complications of the diagnosis(es) in detail. Medication risks, benefits, costs, interactions, and alternatives were discussed as indicated. I advised her to contact the office if her condition worsens, changes or fails to improve as anticipated. She expressed understanding with the diagnosis(es) and plan. Julien Scheuermann, who was evaluated through a patient-initiated, synchronous (real-time) audio-video encounter, and/or her healthcare decision maker, is aware that it is a billable service, with coverage as determined by her insurance carrier. She provided verbal consent to proceed: Yes, and patient identification was verified. It was conducted pursuant to the emergency declaration under the [address] [place] [address] authority and the [employer] General Act. A caregiver was present when appropriate. Ability to conduct physical exam was limited. I was at home. The patient was at home.       Praful Walker MD

## 2020-11-13 ENCOUNTER — HOSPITAL ENCOUNTER (OUTPATIENT)
Dept: LAB | Age: 40
Discharge: HOME | End: 2020-11-13
Payer: OTHER GOVERNMENT

## 2020-11-13 LAB
APPEARANCE UR: CLEAR
BACTERIA URNS QL MICRO: ABNORMAL /HPF
BILIRUB UR QL: NEGATIVE
COLOR UR: YELLOW
EPITH CASTS URNS QL MICRO: ABNORMAL /LPF (ref 0–5)
GLUCOSE UR STRIP.AUTO-MCNC: NEGATIVE MG/DL
HGB UR QL STRIP: ABNORMAL
KETONES UR QL STRIP.AUTO: NEGATIVE MG/DL
LEUKOCYTE ESTERASE UR QL STRIP.AUTO: NEGATIVE
NITRITE UR QL STRIP.AUTO: NEGATIVE
PH UR STRIP: 5.5 [PH] (ref 5–8)
PROT UR STRIP-MCNC: NEGATIVE MG/DL
RBC #/AREA URNS HPF: NEGATIVE /HPF (ref 0–5)
SP GR UR REFRACTOMETRY: 1.02 (ref 1–1.03)
UROBILINOGEN UR QL STRIP.AUTO: 0.2 EU/DL (ref 0.2–1)
WBC URNS QL MICRO: ABNORMAL /HPF (ref 0–4)

## 2020-11-13 PROCEDURE — 87086 URINE CULTURE/COLONY COUNT: CPT

## 2020-11-13 PROCEDURE — 81001 URINALYSIS AUTO W/SCOPE: CPT

## 2020-11-13 PROCEDURE — 36415 COLL VENOUS BLD VENIPUNCTURE: CPT

## 2020-11-13 NOTE — TELEPHONE ENCOUNTER
Late entry:  Patient called the office back on yesterday and was concerned that her EVisit questionnaire would not be addressed until the end of the day. Explained to patient Evisit are not scheduled appointments and the physician will respond within 24 hours. Patient was offered VV with Dr. Abi Rueda yesterday at 1:30 PM and was seen at that time.

## 2020-11-14 LAB
BACTERIA SPEC CULT: NORMAL
SERVICE CMNT-IMP: NORMAL

## 2021-06-15 PROBLEM — R10.2 PERINEAL NEURALGIA: Status: ACTIVE | Noted: 2021-06-15

## 2021-06-15 PROBLEM — N80.30 ENDOMETRIOSIS OF PELVIC PERITONEUM: Status: ACTIVE | Noted: 2021-06-15

## 2021-06-15 PROBLEM — N30.11 CHRONIC INTERSTITIAL CYSTITIS WITH HEMATURIA: Status: ACTIVE | Noted: 2021-06-15

## 2022-03-18 ENCOUNTER — TRANSCRIBE ORDER (OUTPATIENT)
Dept: SCHEDULING | Age: 42
End: 2022-03-18

## 2022-03-18 DIAGNOSIS — M54.16 RADICULOPATHY, LUMBAR REGION: Primary | ICD-10-CM

## 2022-03-18 PROBLEM — R63.6 UNDERWEIGHT DUE TO INADEQUATE CALORIC INTAKE: Status: ACTIVE | Noted: 2019-07-02

## 2022-03-18 PROBLEM — N30.11 CHRONIC INTERSTITIAL CYSTITIS WITH HEMATURIA: Status: ACTIVE | Noted: 2021-06-15

## 2022-03-19 PROBLEM — N80.30 ENDOMETRIOSIS OF PELVIC PERITONEUM: Status: ACTIVE | Noted: 2021-06-15

## 2022-03-19 PROBLEM — R10.2 PERINEAL NEURALGIA: Status: ACTIVE | Noted: 2021-06-15

## 2022-03-19 PROBLEM — H40.003 GLAUCOMA SUSPECT OF BOTH EYES: Status: ACTIVE | Noted: 2017-09-13

## 2022-03-21 ENCOUNTER — TRANSCRIBE ORDER (OUTPATIENT)
Dept: SCHEDULING | Age: 42
End: 2022-03-21

## 2022-04-01 ENCOUNTER — HOSPITAL ENCOUNTER (OUTPATIENT)
Age: 42
Discharge: HOME | End: 2022-04-01
Payer: OTHER GOVERNMENT

## 2022-04-01 DIAGNOSIS — M54.16 RADICULOPATHY, LUMBAR REGION: ICD-10-CM

## 2022-04-01 PROCEDURE — 72195 MRI PELVIS W/O DYE: CPT

## 2022-07-18 ENCOUNTER — TELEPHONE (OUTPATIENT)
Dept: FAMILY MEDICINE CLINIC | Age: 42
End: 2022-07-18

## 2022-07-18 NOTE — TELEPHONE ENCOUNTER
----- Message from Bryce Mason sent at 7/18/2022 10:07 AM EDT -----  Subject: Message to Provider    QUESTIONS  Information for Provider? Patient called to return a phone call to [place]   in the office. Was unable to connect to the office and would like the   office to contact her and follow up with her asap.   ---------------------------------------------------------------------------  --------------  [address] [facility]  [phone]; OK to leave message on voicemail  ---------------------------------------------------------------------------  --------------  SCRIPT ANSWERS  Relationship to Patient?  Self

## 2024-01-08 ENCOUNTER — PATIENT MESSAGE (OUTPATIENT)
Facility: CLINIC | Age: 44
End: 2024-01-08

## 2024-01-09 NOTE — TELEPHONE ENCOUNTER
From: Dwayne Ramirez  To: Dr. Loren Treadwell  Sent: 1/8/2024 11:16 AM EST  Subject: Indira     Hi Dr. Treadwell,   Indira's mental health is not good and she's refusing to take medication. She does go to counseling every week. The psychiatrist wants her to get blood work done. I want all her hormones and vitamin levels checked, but she has a paranoia with needles. She will not do it. Do you know if chkd or anywhere can do something so she can get blood work done. Laughing gas like the dentist or something?! We need help. I did make her start taking certain vitamins this morning and hoping to keep it up for at least 30 days. Vitamins recommended for brain health. Vitamin D, b, omega-3, CQ 10, magnesium, and lithium 5 mg. Psychiatrist said if she gets blood work done she can give a higher dose of lithium, but has to make sure kidneys or liver is filtering it.   Please help us with any information that you can. Even any day program that are therapeutic. Anything...   Thank you  Dwayne Ramirez

## 2024-01-11 ENCOUNTER — PATIENT MESSAGE (OUTPATIENT)
Facility: CLINIC | Age: 44
End: 2024-01-11

## 2024-01-12 NOTE — TELEPHONE ENCOUNTER
From: Dwayne Ramirez  To: Dr. Loren Treadwell  Sent: 1/11/2024 11:54 AM EST  Subject: Rosa    Thank you for your response for Indira. Now onto Rosa. She has multiple small rashes on her torso and upper arms. No fevers. It's been a couple weeks. They seem to be multiplying. I told her last night, to stop all shower soaps etc. I used to have eczema when on gluten. I don't anymore. Could be food related? Testing for it?   Anyway, the soonest dermatologist appointment is April for my lady or Jan 23 for a male. Are you familiar with different kinds of rashes and origins or should I take the dermatologist appointment?   Thank you again for your help,  Dwayne

## 2025-04-09 ENCOUNTER — HOSPITAL ENCOUNTER (EMERGENCY)
Facility: HOSPITAL | Age: 45
Discharge: HOME | End: 2025-04-10
Attending: EMERGENCY MEDICINE
Payer: COMMERCIAL

## 2025-04-09 VITALS
HEIGHT: 67 IN | BODY MASS INDEX: 18.83 KG/M2 | OXYGEN SATURATION: 100 % | RESPIRATION RATE: 15 BRPM | WEIGHT: 120 LBS | SYSTOLIC BLOOD PRESSURE: 134 MMHG | TEMPERATURE: 97.3 F | HEART RATE: 68 BPM | DIASTOLIC BLOOD PRESSURE: 67 MMHG

## 2025-04-09 DIAGNOSIS — L03.313 CELLULITIS OF CHEST WALL: Primary | ICD-10-CM

## 2025-04-09 PROCEDURE — 99283 EMERGENCY DEPT VISIT LOW MDM: CPT

## 2025-04-09 PROCEDURE — 6370000000 HC RX 637 (ALT 250 FOR IP): Performed by: EMERGENCY MEDICINE

## 2025-04-09 RX ORDER — CEPHALEXIN 500 MG/1
500 CAPSULE ORAL 4 TIMES DAILY
Qty: 28 CAPSULE | Refills: 0 | Status: SHIPPED | OUTPATIENT
Start: 2025-04-09 | End: 2025-04-16

## 2025-04-09 RX ADMIN — CEPHALEXIN 500 MG: 250 CAPSULE ORAL at 23:51

## 2025-04-09 ASSESSMENT — LIFESTYLE VARIABLES
HOW MANY STANDARD DRINKS CONTAINING ALCOHOL DO YOU HAVE ON A TYPICAL DAY: 1 OR 2
HOW OFTEN DO YOU HAVE A DRINK CONTAINING ALCOHOL: MONTHLY OR LESS

## 2025-04-09 ASSESSMENT — PAIN - FUNCTIONAL ASSESSMENT: PAIN_FUNCTIONAL_ASSESSMENT: NONE - DENIES PAIN

## 2025-04-10 NOTE — ED NOTES
Discharge instructions reviewed with patient. Patient verbalized understanding. Patient ambulatory to the ED lobby in no acute distress.

## 2025-04-10 NOTE — ED PROVIDER NOTES
[facility] EMERGENCY DEPARTMENT  eMERGENCY dEPARTMENT eNCOUnter      Pt Name: Dwayne Ramirez  MRN: [id number]  Birthdate 1980 of evaluation: 2027  Provider:Stepan Anaya MD    CHIEF COMPLAINT       HPI    Dwayne Ramirez is a 45 y.o. female picking at a skin tag and it got infected. C/O having redness and mild pain over the area of the skin tagl    ROS    Review of Systems   Skin:         Left anterior chest: (+) 3 x 3 inch are of erythema   All other systems reviewed and are negative.      Except as noted above the remainder of the review of systems was reviewed and negative.       PAST MEDICAL HISTORY     Past Medical History:   Diagnosis Date    Autoimmune disease     interstitial cystitis    Murmur, heart      (normal spontaneous vaginal delivery)     36 week EGA         SURGICAL HISTORY       Past Surgical History:   Procedure Laterality Date    OTHER SURGICAL HISTORY  2021    left gonadal vein embolization, foam slerotherapy of pelvic varices - Tori Montalvo MD [facility], [place]    PELVIC LAPAROSCOPY      13 years ago    UROLOGICAL SURGERY      cystoscopy hydrodistention         CURRENTMEDICATIONS       Previous Medications    GABAPENTIN (NEURONTIN) 100 MG CAPSULE    Take 300 mg by mouth every evening.    LATANOPROST (XALATAN) 0.005 % OPHTHALMIC SOLUTION    INT 1 GTT IN [facility] HS       ALLERGIES     Patient has no known allergies.    FAMILY HISTORY       Family History   Problem Relation Age of Onset    Celiac Disease Sister     Hypertension Mother     Bleeding Prob Neg Hx     Anesth Problems Neg Hx     Stroke Maternal Grandfather     Cancer Maternal Grandfather         melanoma    Stroke Paternal Grandfather     Dementia Maternal Grandmother     Glaucoma Maternal Grandmother     Macular Degen Paternal Grandmother           SOCIAL HISTORY       Social History     Socioeconomic History    Marital status:    Tobacco Use     Smoking status: Never    Smokeless tobacco: Never   Substance and Sexual Activity    Alcohol use: No    Drug use: No         PHYSICAL EXAM       ED Triage Vitals [04/09/25 2150]   BP Systolic BP Percentile Diastolic BP Percentile Temp Temp Source Pulse Respirations SpO2   134/67 -- -- 97.3 °F (36.3 °C) Oral 68 15 100 %      Height Weight - Scale         1.702 m (5' 7\") 54.4 kg (120 lb)             Physical Exam  Cardiovascular:      Rate and Rhythm: Normal rate and regular rhythm.   Pulmonary:      Effort: Pulmonary effort is normal.      Breath sounds: Normal breath sounds.   Skin:     Comments: LEFT UPPER CHEST WALL: 4 X 4 inch area of erythema over left upper un apex area.   Neurological:      Mental Status: She is alert.       No results found for this or any previous visit (from the past 24 hours).    PROCEDURES:    E  EMERGENCY DEPARTMENT COURSE and DIFFERENTIALDIAGNOSIS/ MDM:   Vitals:    Vitals:    04/09/25 2150   BP: 134/67   Pulse: 68   Resp: 15   Temp: 97.3 °F (36.3 °C)   TempSrc: Oral   SpO2: 100%   Weight: 54.4 kg (120 lb)   Height: 1.702 m (5' 7\")       MDM      No orders to display       11:17 PM  Upon re-evaluation the patient's symptoms have improved. Pt has non-toxic appearance and condition is stable for discharge. Patient was informed of tests & results, instructed to f/u with PCP and return to the ED upon worsening of symptoms. All questions and concerns were addressed.       Procedures    FINAL IMPRESSION      Cellulitis of left chest     DISPOSITION/PLAN     DISPOSITION   D/C home      DISCHARGE MEDICATIONS:    Keflex 500 mg    PATIENT REFERRED TO: PCP in 5 days.  Return to ER prn.        (Please note that portions of this note were completed with a voice recognitionprogram.  Efforts were made to edit the dictations but occasionally words are mis-transcribed.)    Stepan Anaya MD(electronically signed)  Attending Emergency Physician          Stepan Anaya MD  04/14/25 [id number]

## 2025-04-10 NOTE — ED TRIAGE NOTES
Patient arrives with c/o possible infection to left side of neck. Patient states she was trying to take off a skin tag with freezing medication and essential oil approximately a week ago. Patient reports the oil burned her skin.